# Patient Record
(demographics unavailable — no encounter records)

---

## 2025-04-14 NOTE — PHYSICAL EXAM
[de-identified] : Left foot and ankle examination demonstrates  mild tenderness plantarly near the sesamoids.  His FHL is clinically intact.  He also has some mild tenderness dorsally in the hallux IP joint.  Extensor tendon includes EHL are clinically intact.  He has mild tenderness anterior medial left ankle wherever this is between the anterior and posterior tibial tendon.  There is tenderness in his ankle clinically intact.  There is no effusion. [de-identified] : Weightbearing left foot radiographs (AP/lateral/oblique) were obtained today Indication-pain/sprain Radiographs are negative for fracture or dislocation. These radiographs were interpreted and reviewed with the patient  Weightbearing left ankle radiographs (AP/lateral/oblique) were obtained today Indication-pain Radiographs are negative for fracture or dislocation. These radiographs were interpreted and reviewed with the patient.

## 2025-04-14 NOTE — ASSESSMENT
[FreeTextEntry1] : Patient sustained a hallux sprain.  He has some mild tenderness on the plantar aspect of his sesamoids and dorsal aspect of the IP joint.  Radiographs are negative and his sesamoids are located well. His tendons are clinically intact including EHL and FHL..  His left ankle pain appears to be compensatory.  I recommend the patient modify activity accordingly wear comfortable shoes.  I do not feel he requires a brace or boot.  I would not recommend physical therapy.  He is to slowly increase activities keep me updated next 4 to 6 weeks.  All questions were answered.

## 2025-04-14 NOTE — HISTORY OF PRESENT ILLNESS
[FreeTextEntry1] : The patient is a 63-year-old gentleman that presented chief complaint of the left great toe injury.  He has secondary anterior ankle pain as well.  He states that 1 week ago he hyper extended his hallux when he stepped off a stool.  He has noted some secondary ankle pain anterior medially.  He is a former patient of mine I saw her last year at [facility].  His records are not available.

## 2025-05-21 NOTE — ASSESSMENT
[FreeTextEntry1] : Patient has a left hallux sprain.  I explained that I will still take more time to resolve.  I recommend a stiff soled shoe and/or a carbon fiber Olson's toe extension.  I showed him on his phone Google search.  I reviewed his recovery.  All questions were answered.  Will follow-up in about 6 to 12 weeks depending on his clinical course.  All questions were answered.

## 2025-05-21 NOTE — PHYSICAL EXAM
[de-identified] : Physical examination today demonstrates that his hallux MTP and IP joint are stable and located.  His EHL and active HL are clinically intact.  There is some slight decreased motion no other clinically intact.  He has mild tenderness plantarly in the hallux MP joint but not reproducibly at the sesamoid. [de-identified] : His previous radiographs were once again reviewed from last visit

## 2025-05-21 NOTE — HISTORY OF PRESENT ILLNESS
[FreeTextEntry1] : Patient presents today for follow-up regarding his left hallux MTP/hallux sprain.  He still has pain when he walks up to high as 8 out of 10.  He also has pain with no activity at about 4 out of 10.  He localizes this primarily to hallux MTP joint but also hallux IP joint.  There is more plantar than dorsal.